# Patient Record
Sex: FEMALE | URBAN - METROPOLITAN AREA
[De-identification: names, ages, dates, MRNs, and addresses within clinical notes are randomized per-mention and may not be internally consistent; named-entity substitution may affect disease eponyms.]

---

## 2018-12-06 ENCOUNTER — OFFICE VISIT (OUTPATIENT)
Dept: FAMILY MEDICINE CLINIC | Facility: CLINIC | Age: 38
End: 2018-12-06

## 2018-12-06 VITALS
WEIGHT: 178 LBS | RESPIRATION RATE: 18 BRPM | SYSTOLIC BLOOD PRESSURE: 128 MMHG | OXYGEN SATURATION: 100 % | TEMPERATURE: 98.6 F | HEART RATE: 72 BPM | BODY MASS INDEX: 31.54 KG/M2 | DIASTOLIC BLOOD PRESSURE: 73 MMHG | HEIGHT: 63 IN

## 2018-12-06 DIAGNOSIS — Z00.00 WELL ADULT HEALTH CHECK: Primary | ICD-10-CM

## 2018-12-06 DIAGNOSIS — E66.9 OBESITY (BMI 30-39.9): ICD-10-CM

## 2018-12-06 NOTE — PROGRESS NOTES
Chief Complaint Patient presents with  New Patient NO medical problems just need PCP requesting labs She is a 40 y.o. female who presents for evalution. Reviewed PmHx, RxHx, FmHx, SocHx, AllgHx and updated and dated in the chart. There are no active problems to display for this patient. Review of Systems - negative except as listed above in the HPI Objective:  
 
Vitals:  
 12/06/18 1439 BP: 128/73 Pulse: 72 Resp: 18 Temp: 98.6 °F (37 °C) TempSrc: Oral  
SpO2: 100% Weight: 178 lb (80.7 kg) Height: 5' 3\" (1.6 m) Physical Examination: {male adult master:[id number]} Assessment/ Plan:  
{There are no diagnoses linked to this encounter. (Refresh or delete this SmartLink)} Follow-up Disposition: Not on File I have discussed the diagnosis with the patient and the intended plan as seen in the above orders. The patient understands and agrees with the plan. The patient has received an after-visit summary and questions were answered concerning future plans. Medication Side Effects and Warnings were discussed with patient Patient Labs were reviewed and or requested: 
Patient Past Records were reviewed and or requested Priya Hayden. Colonel Jacquelin MD 
 
There are no Patient Instructions on file for this visit.

## 2018-12-06 NOTE — PROGRESS NOTES
HISTORY OF PRESENT ILLNESS Irby Mohs is a 40 y.o. female. This is a 40year old female presenting for an initial visit for examination She admits to have an increased BMI and is trying to adjust her diet on her own. No pains in any area or breathing difficulty Not on any medications  miscarriage 1 Non smoker, non drinker Works at [employer], mild back pain from lifting , amenable to OTC meds FH diabetes, high blood pressure MGM MI dad  COPD, sarcoidosis No cancers are admitted to in the family Two siblings, a brother and a sister from her mother and father's side of the family Operations; C section Review of Systems Constitutional: Negative for chills, diaphoresis, fever, malaise/fatigue and weight loss. HENT: Negative for congestion, ear discharge, ear pain, hearing loss, nosebleeds, sinus pain, sore throat and tinnitus. Eyes: Negative for blurred vision, double vision, photophobia, pain and discharge. Respiratory: Negative for cough, hemoptysis, sputum production, shortness of breath, wheezing and stridor. Cardiovascular: Negative for chest pain, palpitations, orthopnea, claudication, leg swelling and PND. Gastrointestinal: Negative for abdominal pain, blood in stool, constipation, diarrhea, heartburn, melena and nausea. Genitourinary: Negative for dysuria, flank pain, frequency, hematuria and urgency. Musculoskeletal: Negative for back pain, falls, joint pain, myalgias and neck pain. Skin: Negative for itching and rash. Neurological: Negative for dizziness, sensory change, speech change, focal weakness, seizures, loss of consciousness and headaches. Endo/Heme/Allergies: Negative for polydipsia. Does not bruise/bleed easily. Psychiatric/Behavioral: Negative for depression, hallucinations, substance abuse and suicidal ideas. The patient is not nervous/anxious. Visit Vitals /73 Pulse 72 Temp 98.6 °F (37 °C) (Oral) Resp 18 Ht 5' 3\" (1.6 m) Wt 178 lb (80.7 kg) SpO2 100% BMI 31.53 kg/m² Physical Exam  
Constitutional: She is oriented to person, place, and time. She appears well-developed and well-nourished. HENT:  
Head: Normocephalic and atraumatic. Right Ear: External ear normal.  
Left Ear: External ear normal.  
Nose: Nose normal.  
Mouth/Throat: Oropharynx is clear and moist.  
Eyes: Conjunctivae and EOM are normal. Pupils are equal, round, and reactive to light. Neck: Normal range of motion. Neck supple. No JVD present. No tracheal deviation present. No thyromegaly present. Cardiovascular: Regular rhythm, normal heart sounds and intact distal pulses. Pulmonary/Chest: Effort normal and breath sounds normal. No stridor. No respiratory distress. She has no wheezes. She has no rales. She exhibits no tenderness. Abdominal: Soft. Bowel sounds are normal. She exhibits no distension and no mass. There is no tenderness. There is no rebound and no guarding. Musculoskeletal: Normal range of motion. She exhibits no edema. Lymphadenopathy:  
  She has no cervical adenopathy. Neurological: She is alert and oriented to person, place, and time. Skin: Skin is warm and dry. No rash noted. No erythema. Psychiatric: She has a normal mood and affect. Her behavior is normal. Judgment normal.  
Nursing note and vitals reviewed. ASSESSMENT and PLAN 
  ICD-10-CM ICD-9-CM 1. Well adult health check Z00.00 V70.0 2. Obesity (BMI 30-39. 9) E66.9 278.00 Follow-up Disposition: 
Return in about 1 year (around 12/6/2019), or if symptoms worsen or fail to improve. reviewed diet, exercise and weight control

## 2024-10-18 ENCOUNTER — HOSPITAL ENCOUNTER (EMERGENCY)
Facility: HOSPITAL | Age: 44
Discharge: HOME | End: 2024-10-18

## 2024-10-18 VITALS
HEART RATE: 76 BPM | HEIGHT: 62 IN | DIASTOLIC BLOOD PRESSURE: 76 MMHG | RESPIRATION RATE: 15 BRPM | SYSTOLIC BLOOD PRESSURE: 133 MMHG | BODY MASS INDEX: 30.91 KG/M2 | WEIGHT: 168 LBS | OXYGEN SATURATION: 100 % | TEMPERATURE: 98.5 F

## 2024-10-18 DIAGNOSIS — Z32.01 PREGNANCY TEST POSITIVE: Primary | ICD-10-CM

## 2024-10-18 LAB
APPEARANCE UR: CLEAR
BACTERIA URNS QL MICRO: ABNORMAL /HPF
BILIRUB UR QL: NEGATIVE
COLOR UR: YELLOW
EPITH CASTS URNS QL MICRO: ABNORMAL /LPF (ref 0–5)
GLUCOSE UR STRIP.AUTO-MCNC: NEGATIVE MG/DL
HCG UR QL: POSITIVE
HGB UR QL STRIP: NEGATIVE
KETONES UR QL STRIP.AUTO: NEGATIVE MG/DL
LEUKOCYTE ESTERASE UR QL STRIP.AUTO: ABNORMAL
NITRITE UR QL STRIP.AUTO: NEGATIVE
PH UR STRIP: 8 (ref 5–8)
PROT UR STRIP-MCNC: 30 MG/DL
RBC #/AREA URNS HPF: ABNORMAL /HPF (ref 0–5)
SP GR UR REFRACTOMETRY: 1.03 (ref 1–1.03)
UROBILINOGEN UR QL STRIP.AUTO: 1 EU/DL (ref 0.2–1)
WBC URNS QL MICRO: ABNORMAL /HPF (ref 0–4)

## 2024-10-18 PROCEDURE — 99283 EMERGENCY DEPT VISIT LOW MDM: CPT

## 2024-10-18 PROCEDURE — 87086 URINE CULTURE/COLONY COUNT: CPT

## 2024-10-18 PROCEDURE — 81001 URINALYSIS AUTO W/SCOPE: CPT

## 2024-10-18 PROCEDURE — 81025 URINE PREGNANCY TEST: CPT

## 2024-10-18 RX ORDER — METOCLOPRAMIDE 10 MG/1
10 TABLET ORAL 4 TIMES DAILY
Qty: 12 TABLET | Refills: 0 | Status: SHIPPED | OUTPATIENT
Start: 2024-10-18

## 2024-10-18 RX ORDER — PNV NO.95/FERROUS FUM/FOLIC AC 28MG-0.8MG
1 TABLET ORAL DAILY
Qty: 30 TABLET | Refills: 0 | Status: SHIPPED | OUTPATIENT
Start: 2024-10-18

## 2024-10-18 ASSESSMENT — PAIN - FUNCTIONAL ASSESSMENT: PAIN_FUNCTIONAL_ASSESSMENT: NONE - DENIES PAIN

## 2024-10-18 NOTE — ED TRIAGE NOTES
Patient reports she normally gets her menstrual cycle on the 2nd of every month. States she has not started for the month of October and had two negative pregnancy tests at home.

## 2024-10-19 NOTE — ED NOTES
Discharge instructions and medications reviewed with the patient. Patient verbalized understanding. Patient in stable condition at discharge--ambulatory with a steady gait.

## 2024-10-19 NOTE — ED PROVIDER NOTES
[facility] EMERGENCY DEPT  EMERGENCY DEPARTMENT ENCOUNTER      Pt Name: Chloe Pulliam  MRN: [id number]  Birthdate 1980  Date of evaluation: 10/18/2024  Provider: ALETHA Delaney  8:48 PM    CHIEF COMPLAINT       Chief Complaint   Patient presents with    Amenorrhea         HISTORY OF PRESENT ILLNESS    Chloe Pulliam is a 43 y.o. female who presents to the emergency department with a complaint of no menstrual cycle since the second of last month.  Denies pelvic pain spotting.  She is hoping it begins to be beginning of premenopause as she is a mean he has a son who is 24 and a grandchild already.    HPI    Nursing Notes were reviewed.    REVIEW OF SYSTEMS       Review of Systems   Constitutional:  Negative for fatigue.   Genitourinary:  Positive for menstrual problem. Negative for pelvic pain, vaginal bleeding and vaginal discharge.       Except as noted above the remainder of the review of systems was reviewed and negative.       PAST MEDICAL HISTORY   History reviewed. No pertinent past medical history.      SURGICAL HISTORY       Past Surgical History:   Procedure Laterality Date     SECTION           CURRENT MEDICATIONS       Previous Medications    No medications on file       ALLERGIES     Patient has no known allergies.    FAMILY HISTORY     History reviewed. No pertinent family history.       SOCIAL HISTORY       Social History     Socioeconomic History    Marital status: Unknown     Spouse name: None    Number of children: None    Years of education: None    Highest education level: None       SCREENINGS         Depue Coma Scale  Eye Opening: Spontaneous  Best Verbal Response: Oriented  Best Motor Response: Obeys commands  Kam Coma Scale Score: 15                     CIWA Assessment  BP: 133/76  Pulse: 76                 PHYSICAL EXAM       ED Triage Vitals [10/18/24 1912]   BP Systolic BP Percentile Diastolic BP Percentile Temp Temp Source Pulse Respirations SpO2   133/76 -- -- 98.5 °F (36.9  °C) Oral 76 15 100 %      Height Weight - Scale         1.575 m (5' 2\") 76.2 kg (168 lb)             Physical Exam  Constitutional:       General: She is not in acute distress.     Appearance: Normal appearance. She is normal weight. She is not toxic-appearing.   HENT:      Head: Normocephalic.      Nose: Nose normal.      Mouth/Throat:      Mouth: Mucous membranes are moist.   Eyes:      Extraocular Movements: Extraocular movements intact.   Cardiovascular:      Rate and Rhythm: Normal rate and regular rhythm.      Pulses: Normal pulses.      Heart sounds: Normal heart sounds.   Pulmonary:      Effort: Pulmonary effort is normal.      Breath sounds: Normal breath sounds.   Abdominal:      General: Abdomen is flat.      Tenderness: There is no abdominal tenderness.   Musculoskeletal:         General: No deformity.      Cervical back: Normal range of motion and neck supple. No rigidity.   Skin:     General: Skin is warm.   Neurological:      Mental Status: She is alert and oriented to person, place, and time.   Psychiatric:         Mood and Affect: Mood normal.         Behavior: Behavior normal.         Thought Content: Thought content normal.         Judgment: Judgment normal.         DIAGNOSTIC RESULTS   LABS:  Labs Reviewed   URINALYSIS - Abnormal; Notable for the following components:       Result Value    Protein, UA 30 (*)     Leukocyte Esterase, Urine SMALL (*)     All other components within normal limits   PREGNANCY, URINE - Abnormal; Notable for the following components:    Pregnancy, Urine Positive (*)     All other components within normal limits   URINALYSIS, MICRO - Abnormal; Notable for the following components:    BACTERIA, URINE FEW (*)     All other components within normal limits   CULTURE, URINE       All other labs were within normal range or not returned as of this dictation.    EMERGENCY DEPARTMENT COURSE and DIFFERENTIAL DIAGNOSIS/MDM:   Vitals:    Vitals:    10/18/24 1912   BP: 133/76

## 2024-10-20 LAB
BACTERIA SPEC CULT: NORMAL
SERVICE CMNT-IMP: NORMAL